# Patient Record
Sex: FEMALE
[De-identification: names, ages, dates, MRNs, and addresses within clinical notes are randomized per-mention and may not be internally consistent; named-entity substitution may affect disease eponyms.]

---

## 2018-06-11 LAB
BUN BLD-MCNC: 14 MG/DL (ref 6–20)
GLUCOSE SERPLBLD-MCNC: 95 MG/DL (ref 65–120)
HCT VFR BLD CALC: 37.9 % (ref 36–45)
INR BLD: 0.92
LYMPHOCYTES # SPEC AUTO: 2.2 K/UL (ref 0.7–4.9)
MCH RBC QN AUTO: 28.7 PG (ref 27–35)
MCV RBC: 87.1 FL (ref 80–100)
PMV BLD: 11 FL (ref 7.6–11.3)
POTASSIUM SERPL-SCNC: 4.2 MEQ/L (ref 3.6–5)
RBC # BLD: 4.36 M/UL (ref 3.86–4.86)
UA DIPSTICK W REFLEX MICRO PNL UR: (no result)

## 2018-06-14 ENCOUNTER — HOSPITAL ENCOUNTER (OUTPATIENT)
Dept: HOSPITAL 97 - OR | Age: 48
Discharge: HOME | End: 2018-06-14
Attending: OBSTETRICS & GYNECOLOGY
Payer: COMMERCIAL

## 2018-06-14 DIAGNOSIS — N92.1: Primary | ICD-10-CM

## 2018-06-14 DIAGNOSIS — N94.6: ICD-10-CM

## 2018-06-14 DIAGNOSIS — Z79.82: ICD-10-CM

## 2018-06-14 DIAGNOSIS — Z86.73: ICD-10-CM

## 2018-06-14 DIAGNOSIS — N83.8: ICD-10-CM

## 2018-06-14 DIAGNOSIS — K66.0: ICD-10-CM

## 2018-06-14 DIAGNOSIS — N94.10: ICD-10-CM

## 2018-06-14 DIAGNOSIS — N70.11: ICD-10-CM

## 2018-06-14 DIAGNOSIS — Z80.3: ICD-10-CM

## 2018-06-14 PROCEDURE — 0DNU4ZZ RELEASE OMENTUM, PERCUTANEOUS ENDOSCOPIC APPROACH: ICD-10-PCS

## 2018-06-14 PROCEDURE — 0UT24ZZ RESECTION OF BILATERAL OVARIES, PERCUTANEOUS ENDOSCOPIC APPROACH: ICD-10-PCS

## 2018-06-14 PROCEDURE — 36415 COLL VENOUS BLD VENIPUNCTURE: CPT

## 2018-06-14 PROCEDURE — 86850 RBC ANTIBODY SCREEN: CPT

## 2018-06-14 PROCEDURE — 0UT94ZZ RESECTION OF UTERUS, PERCUTANEOUS ENDOSCOPIC APPROACH: ICD-10-PCS

## 2018-06-14 PROCEDURE — 81025 URINE PREGNANCY TEST: CPT

## 2018-06-14 PROCEDURE — 85730 THROMBOPLASTIN TIME PARTIAL: CPT

## 2018-06-14 PROCEDURE — 88305 TISSUE EXAM BY PATHOLOGIST: CPT

## 2018-06-14 PROCEDURE — 0UT74ZZ RESECTION OF BILATERAL FALLOPIAN TUBES, PERCUTANEOUS ENDOSCOPIC APPROACH: ICD-10-PCS

## 2018-06-14 PROCEDURE — 88307 TISSUE EXAM BY PATHOLOGIST: CPT

## 2018-06-14 PROCEDURE — 81003 URINALYSIS AUTO W/O SCOPE: CPT

## 2018-06-14 PROCEDURE — 86900 BLOOD TYPING SEROLOGIC ABO: CPT

## 2018-06-14 PROCEDURE — 80048 BASIC METABOLIC PNL TOTAL CA: CPT

## 2018-06-14 PROCEDURE — 85610 PROTHROMBIN TIME: CPT

## 2018-06-14 PROCEDURE — 86901 BLOOD TYPING SEROLOGIC RH(D): CPT

## 2018-06-14 PROCEDURE — 85025 COMPLETE CBC W/AUTO DIFF WBC: CPT

## 2018-06-14 NOTE — XMS REPORT
Patient Summary Document

 Created on:2018



Patient:ITZEL STEEN

Sex:Female

:1970

External Reference #:[id number]





Demographics







 Address  [address] [place] [address]

 

 Home Phone  [phone]

 

 Email Address  [email or web address]

 

 Preferred Language  Unknown

 

 Marital Status  Unknown

 

 Catholic Affiliation  Unknown

 

 Race  Unknown

 

 Additional Race(s)  Unavailable

 

 Ethnic Group  Unknown









Author







 Organization  [facility]

 

 Address  [address] [place] [address] [address]. Ambrosio [address] [place] [address]

 

 Phone  [phone]









Care Team Providers







 Name  Role  Phone

 

 EDIS TREADWELL  Unavailable  Unavailable

 

 NICK ELLINGTON MIRYAM  Unavailable  Unavailable









Problems

This patient has no known problems.



Allergies, Adverse Reactions, Alerts

This patient has no known allergies or adverse reactions.



Medications

This patient has no known medications.



Results







 Test Description  Test Time  Test Comments  Text Results  Atomic Results  
Result Comments









 RAD, CHEST, 2  2018  Reason for exam:->Chest  FINAL REPORT PATIENT  



 VIEWS  11:24:00  painIs the patient  ID:   [id number]  PA and  



     pregnant?->NoShould this be  Lateral views of the  



     performed at the  chest dated 3/17/2018  



     bedside?->No  Clinical information:  



       Chest pain Comment:  



       Heart is normal in  



       size. Pulmonary  



       vasculature is  



       unremarkable. Lungs are  



       clear. No pulmonary  



       infiltrate or pleural  



       effusion is present.  



       Impression:  No active  



       cardiopulmonary  



       disease. Signed: Kvng Ayala Verified  



       Date/Time:  2018  



       11:24:20 Reading  



       Location: [address]  



       Ortho Consult Reading  



       Room  



       Electronically signed  



       by: KVNG AYALA M.D.  



       on 2018 11:24 AM  









 PT/APTT  2018 11:14:00    









   Test Item  Value  Reference Range  Comments









 PROTIME (BEAKER) (test code=759)  13.6 seconds  11.7-14.7  

 

 INR (BEAKER) (test code=370)  1.0  <=5.9  

 

 PARTIAL THROMBOPLASTIN TIME (BEAKER) (test code=760)  28.1 seconds  22.5-36.0  



RECOMMENDED COUMADIN/WARFARIN INR THERAPY RANGESSTANDARD DOSE: 2.0 - 3.0   
Includes: PROPHYLAXIS forvenous thrombosis, systemic embolization; TREATMENT 
for venous thrombosis and/or pulmonary embolus.HIGH RISK: Target INR is 2.5-3.5 
for patients with mechanical heart valves.CREATINE KINASE (CK), TOTAL AND [id number] 11:14:00





 Test Item  Value  Reference Range  Comments

 

 CREATINE KINASE TOTAL (BEAKER) (test code=380)  87 U/L    

 

 CREATINE KINASE-MB (BEAKER) (test code=750)  0.5 ng/mL  0.0-6.6  

 

 CREATINE KINASE-MB INDEX (BEAKER) (test code=395)  0.6 %    



CK-MB Reference Range:&lt;6.7      Normal6.7-10.0  Borderline&gt;10.0     
AbnormalTROPONIN -15-80 11:14:00





 Test Item  Value  Reference Range  Comments

 

 TROPONIN I (BEAKER) (test code=397)  < ng/mL  0.00-0.03  








 Test Item  Value  Reference Range  Comments

 

 MAGNESIUM (BEAKER) (test code=627)  2.1 mg/dL  1.6-2.6  



BASIC METABOLIC [id number] 11:12:00





 Test Item  Value  Reference Range  Comments

 

 SODIUM (BEAKER) (test  140 meq/L  136-145  



 wxrq=443)      

 

 POTASSIUM (BEAKER) (test  3.7 meq/L  3.5-5.1  



 code=379)      

 

 CHLORIDE (BEAKER) (test  105 meq/L    



 code=382)      

 

 CO2 (BEAKER) (test  25 meq/L  22-29  



 code=355)      

 

 BLOOD UREA NITROGEN  12 mg/dL  7-21  



 (BEAKER) (test code=354)      

 

 CREATININE (BEAKER) (test  0.66 mg/dL  0.57-1.25  



 code=358)      

 

 GLUCOSE RANDOM (BEAKER)  101 mg/dL    



 (test code=652)      

 

 CALCIUM (BEAKER) (test  9.4 mg/dL  8.4-10.2  



 code=697)      

 

 EGFR (BEAKER) (test  96 mL/min/1.73 sq m    ESTIMATED GFR IS NOT AS



 code=1092)      ACCURATE AS CREATININE



       CLEARANCE IN PREDICTING



       GLOMERULAR FILTRATION



       RATE. ESTIMATED GFR IS



       NOT APPLICABLE FOR



       DIALYSIS PATIENTS.



HEPATIC FUNCTION [id number] 11:12:00





 Test Item  Value  Reference Range  Comments

 

 TOTAL PROTEIN (BEAKER) (test code=770)  7.8 gm/dL  6.0-8.3  

 

 ALBUMIN (BEAKER) (test code=1145)  4.4 g/dL  3.5-5.0  

 

 BILIRUBIN TOTAL (BEAKER) (test code=377)  0.6 mg/dL  0.2-1.2  

 

 BILIRUBIN DIRECT (BEAKER) (test code=706)  0.3 mg/dL  0.1-0.5  

 

 ALKALINE PHOSPHATASE (BEAKER) (test code=346)  112 U/L    

 

 AST (SGOT) (BEAKER) (test code=353)  22 U/L  5-34  

 

 ALT (SGPT) (BEAKER) (test code=347)  24 U/L  6-55  



CBC W/PLT COUNT &amp; AUTO [id number] 10:51:00





 Test Item  Value  Reference Range  Comments

 

 WHITE BLOOD CELL COUNT (BEAKER) (test code=775)  8.8 K/ L  3.5-10.5  

 

 RED BLOOD CELL COUNT (BEAKER) (test code=761)  4.11 M/ L  3.93-5.22  

 

 HEMOGLOBIN (BEAKER) (test code=410)  12.2 GM/DL  11.2-15.7  

 

 HEMATOCRIT (BEAKER) (test code=411)  36.8 %  34.1-44.9  

 

 MEAN CORPUSCULAR VOLUME (BEAKER) (test code=753)  89.5 fL  79.4-94.8  

 

 MEAN CORPUSCULAR HEMOGLOBIN (BEAKER) (test  29.7 pg  25.6-32.2  



 fqld=174)      

 

 MEAN CORPUSCULAR HEMOGLOBIN CONC (BEAKER) (test  33.2 GM/DL  32.2-35.5  



 code=752)      

 

 RED CELL DISTRIBUTION WIDTH (BEAKER) (test  13.1 %  11.7-14.4  



 code=412)      

 

 PLATELET COUNT (BEAKER) (test code=756)  247 K/CU MM  150-450  

 

 MEAN PLATELET VOLUME (BEAKER) (test code=754)  11.8 fL  9.4-12.3  

 

 NUCLEATED RED BLOOD CELLS (BEAKER) (test  0 /100 WBC  0-0  



 code=413)      

 

 NEUTROPHILS RELATIVE PERCENT (BEAKER) (test  74 %    



 code=429)      

 

 LYMPHOCYTES RELATIVE PERCENT (BEAKER) (test  20 %    



 code=430)      

 

 MONOCYTES RELATIVE PERCENT (BEAKER) (test  5 %    



 code=431)      

 

 EOSINOPHILS RELATIVE PERCENT (BEAKER) (test  0 %    



 code=432)      

 

 BASOPHILS RELATIVE PERCENT (BEAKER) (test  1 %    



 code=437)      

 

 NEUTROPHILS ABSOLUTE COUNT (BEAKER) (test  6.49 K/ L  1.56-6.13  



 code=670)      

 

 LYMPHOCYTES ABSOLUTE COUNT (BEAKER) (test  1.77 K/ L  1.18-3.74  



 code=414)      

 

 MONOCYTES ABSOLUTE COUNT (BEAKER) (test  0.43 K/ L  0.24-0.36  



 code=415)      

 

 EOSINOPHILS ABSOLUTE COUNT (BEAKER) (test  0.01 K/ L  0.04-0.36  



 code=416)      

 

 BASOPHILS ABSOLUTE COUNT (BEAKER) (test  0.04 K/ L  0.01-0.08  



 code=417)      

 

 IMMATURE GRANULOCYTES-RELATIVE PERCENT (BEAKER)  0 %  0-1  



 (test code=2801)      



BASIC METABOLIC [id number] 04:33:00





 Test Item  Value  Reference Range  Comments

 

 SODIUM (BEAKER) (test  140 meq/L  136-145  



 xjal=418)      

 

 POTASSIUM (BEAKER) (test  3.7 meq/L  3.5-5.1  Specimen slightly



 code=379)      hemolyzed

 

 CHLORIDE (BEAKER) (test  105 meq/L    



 code=382)      

 

 CO2 (BEAKER) (test  23 meq/L  22-29  



 code=355)      

 

 BLOOD UREA NITROGEN  16 mg/dL  7-21  



 (BEAKER) (test code=354)      

 

 CREATININE (BEAKER) (test  0.75 mg/dL  0.57-1.25  Specimen slightly



 code=358)      hemolyzed

 

 GLUCOSE RANDOM (BEAKER)  96 mg/dL    



 (test code=652)      

 

 CALCIUM (BEAKER) (test  9.2 mg/dL  8.4-10.2  



 code=697)      

 

 EGFR (BEAKER) (test  83 mL/min/1.73 sq m    ESTIMATED GFR IS NOT AS



 code=1092)      ACCURATE AS CREATININE



       CLEARANCE IN PREDICTING



       GLOMERULAR FILTRATION



       RATE. ESTIMATED GFR IS



       NOT APPLICABLE FOR



       DIALYSIS PATIENTS.



CBC W/PLT COUNT &amp; AUTO [id number] 04:11:00





 Test Item  Value  Reference Range  Comments

 

 WHITE BLOOD CELL COUNT (BEAKER) (test code=775)  8.6 K/ L  3.5-10.5  

 

 RED BLOOD CELL COUNT (BEAKER) (test code=761)  4.39 M/ L  3.93-5.22  

 

 HEMOGLOBIN (BEAKER) (test code=410)  12.8 GM/DL  11.2-15.7  

 

 HEMATOCRIT (BEAKER) (test code=411)  39.6 %  34.1-44.9  

 

 MEAN CORPUSCULAR VOLUME (BEAKER) (test code=753)  90.2 fL  79.4-94.8  

 

 MEAN CORPUSCULAR HEMOGLOBIN (BEAKER) (test  29.2 pg  25.6-32.2  



 gmvj=904)      

 

 MEAN CORPUSCULAR HEMOGLOBIN CONC (BEAKER) (test  32.3 GM/DL  32.2-35.5  



 code=752)      

 

 RED CELL DISTRIBUTION WIDTH (BEAKER) (test  13.7 %  11.7-14.4  



 code=412)      

 

 PLATELET COUNT (BEAKER) (test code=756)  253 K/CU MM  150-450  

 

 MEAN PLATELET VOLUME (BEAKER) (test code=754)  12.4 fL  9.4-12.3  

 

 NUCLEATED RED BLOOD CELLS (BEAKER) (test  0 /100 WBC  0-0  



 code=413)      

 

 NEUTROPHILS RELATIVE PERCENT (BEAKER) (test  50 %    



 code=429)      

 

 LYMPHOCYTES RELATIVE PERCENT (BEAKER) (test  40 %    



 code=430)      

 

 MONOCYTES RELATIVE PERCENT (BEAKER) (test  8 %    



 code=431)      

 

 EOSINOPHILS RELATIVE PERCENT (BEAKER) (test  2 %    



 code=432)      

 

 BASOPHILS RELATIVE PERCENT (BEAKER) (test  1 %    



 code=437)      

 

 NEUTROPHILS ABSOLUTE COUNT (BEAKER) (test  4.28 K/ L  1.56-6.13  



 code=670)      

 

 LYMPHOCYTES ABSOLUTE COUNT (BEAKER) (test  3.47 K/ L  1.18-3.74  



 code=414)      

 

 MONOCYTES ABSOLUTE COUNT (BEAKER) (test  0.68 K/ L  0.24-0.36  



 code=415)      

 

 EOSINOPHILS ABSOLUTE COUNT (BEAKER) (test  0.14 K/ L  0.04-0.36  



 code=416)      

 

 BASOPHILS ABSOLUTE COUNT (BEAKER) (test  0.05 K/ L  0.01-0.08  



 code=417)      

 

 IMMATURE GRANULOCYTES-RELATIVE PERCENT (BEAKER)  0 %  0-1  



 (test code=2801)      



MR, MRA, NECK, WITHOUT IV CONTRAST2018-03-10 17:43:00FINAL REPORT PATIENT ID:   
[id number] MRA Head CLINICAL HISTORY: Stroke TECHNIQUE: MRA of the head utilizing 
3-D time-of-flight technique, with 3-D reconstructions. COMPARISON: None 
FINDINGS: There is noevidence of intracranial aneurysm, focal stenosis, or 
major branch vessel occlusion.   There is a fetal origin right posterior 
cerebral artery. IMPRESSION: No evidence for a major [place] proximal 
branch vessel occlusion.   MRA Neck CLINICAL HISTORY: Stroke TECHNIQUE: MRA of 
the neck utilizing 2-D and 3-D time-of-flight technique, with 3-D 
reconstructions. COMPARISON: None FINDINGS: The carotid arteries in the neck 
are patent including their bifurcations.    There is antegrade flow in the 
vertebral arteries in the neck. IMPRESSION: No evidence of hemodynamically 
significant stenosis in thecervical carotid or vertebral arteries by NASCET 
criteria. Signed: Doreen Schmidt MDReport Verified Date/Time:  03/10/2018 17:43:
53 Reading Location: [address] Neuro Reading Room      Electronically signed 
by: DOREEN SCHMIDT M.D. on 03/10/2018 05:43 PMMR, MRA, BRAIN, WITHOUT 
CONTRAST2018-03-10 17:43:00Reason for exam:-&gt;StrokeIs the patient pregnant?-&
gt;NoWhat is the patient's sedation requirement?-&gt;No SedationIs the patient 
claustrophobic?-&gt;NoFINAL REPORT PATIENT ID:   [id number] MRA Head CLINICAL 
HISTORY: Stroke TECHNIQUE: MRA of the head utilizing 3-D time-of-flight 
technique, with 3-D reconstructions. COMPARISON: None FINDINGS: There is 
noevidence of intracranial aneurysm, focal stenosis, or major branch vessel 
occlusion.   There is a fetal origin right posterior cerebral artery. IMPRESSION
: No evidence for a major [place] proximal branch vessel occlusion.   
MRA Neck CLINICAL HISTORY: Stroke TECHNIQUE: MRA of the neck utilizing 2-D and 3
-D time-of-flight technique, with 3-D reconstructions. COMPARISON: None FINDINGS
: The carotid arteries in the neck are patent including their bifurcations.    
There is antegrade flow in the vertebral arteries in the neck. IMPRESSION: No 
evidence of hemodynamically significant stenosis in thecervical carotid or 
vertebral arteries by NASCET criteria. Signed: Doreen Schmidt Verified 
Date/Time:  03/10/2018 17:43:53 Reading Location: [address] Neuro Reading 
Room      Electronically signed by: DOREEN SCHMIDT M.D. on 03/10/2018 05:43 
PMMR, BRAIN, WITHOUT CONTRAST2018-03-10 17:37:00Reason for exam:-&gt;StrokeIs 
the patient pregnant?-&gt;NoWhat is the patient's sedation requirement?-&gt;No 
SedationIs the patient claustrophobic?-&gt;NoFINAL REPORT PATIENT ID:   
[id number] MRI Brain without contrast Clinical History: Stroke Technique: MRI of 
the brain utilizing axial T2, FLAIR, GRE, DWI; sagittal and coronal T1-weighted 
images. Comparisons: None Findings: There is no evidence of acute infarct or 
hemorrhage. There is no significant appearing white matter disease. There is 
mild generalized sulcal prominence without hydrocephalus, midline shift, or 
apparent mass effect. There are no extra-axial fluid collections. The 
craniocervical junction is preserved. The major intracranial flow-voids appear 
patent.  IMPRESSION: No evidence of acute infarct, hemorrhage, or 
hydrocephalus. Signed: Doreen Schmidt Verified Date/Time:  03/10/2018 17
:37:18 Reading Location: [address] Neuro Reading Room      Electronically 
signed by: DOREEN GEORGE M.D. on 03/10/2018 05:37 PMHEMOGLOBIN A1C2018-03-10 11
:57:00





 Test Item  Value  Reference Range  Comments

 

 HEMOGLOBIN A1C (BEAKER) (test code=368)  5.3 %  4.3-6.1  



VITAMIN B12 AND FOLATE2018-03-10 05:35:00





 Test Item  Value  Reference Range  Comments

 

 VITAMIN B12 (BEAKER) (test code=774)  > pg/mL  213-816  

 

 FOLATE (BEAKER) (test code=362)  15.0 ng/mL  >=7.0  



LIPID PANEL2018-03-10 04:56:00





 Test Item  Value  Reference Range  Comments

 

 TRIGLYCERIDES (BEAKER) (test code=540)  58 mg/dL    

 

 CHOLESTEROL (BEAKER) (test code=631)  170 mg/dL    

 

 HDL CHOLESTEROL (BEAKER) (test code=976)  51 mg/dL    

 

 LDL CHOLESTEROL CALCULATED (BEAKER) (test  107 mg/dL    



 code=633)      



Triglyceride Reference Range:   Low Risk         &lt;150   Borderline    150-
199   High Risk     200-499   Very High Risk  &gt;=500Cholesterol Reference 
Range:   Low Risk         &lt;200   Borderline 200-239    High Risk        &gt;
240HDL Cholesterol Reference Range:   Low Risk         &gt;=60   High Risk     
    &lt;40LDL Cholesterol Reference Range:   Optimal          &lt;100   Near 
Optimal  100-129   Borderline    130-159   High          160-189   Very High   
    &gt;=190   FastingBASIC METABOLIC PANEL2018-03-10 04:56:00





 Test Item  Value  Reference Range  Comments

 

 SODIUM (BEAKER) (test  138 meq/L  136-145  



 ddfd=158)      

 

 POTASSIUM (BEAKER) (test  3.9 meq/L  3.5-5.1  



 code=379)      

 

 CHLORIDE (BEAKER) (test  108 meq/L    



 code=382)      

 

 CO2 (BEAKER) (test  21 meq/L  22-29  



 code=355)      

 

 BLOOD UREA NITROGEN  14 mg/dL  7-21  



 (BEAKER) (test code=354)      

 

 CREATININE (BEAKER) (test  0.65 mg/dL  0.57-1.25  



 code=358)      

 

 GLUCOSE RANDOM (BEAKER)  100 mg/dL    



 (test code=652)      

 

 CALCIUM (BEAKER) (test  9.1 mg/dL  8.4-10.2  



 code=697)      

 

 EGFR (BEAKER) (test  98 mL/min/1.73 sq m    ESTIMATED GFR IS NOT AS



 code=1092)      ACCURATE AS CREATININE



       CLEARANCE IN PREDICTING



       GLOMERULAR FILTRATION



       RATE. ESTIMATED GFR IS



       NOT APPLICABLE FOR



       DIALYSIS PATIENTS.



FastingTROPONIN -03-10 04:54:00





 Test Item  Value  Reference Range  Comments

 

 TROPONIN I (BEAKER) (test code=397)  0.01 ng/mL  0.00-0.03  



Troponin I (TnI) levels must be interpreted in the context of the presenting 
symptoms and the clinical findings. Elevated TnI levels indicate myocardial 
damage, but are not specific for ischemic heart disease. Elevated TnI levels 
are seen in patients with other cardiac conditions (including myocarditis and 
congestive heart failure), and slight TnI elevations occur in patients with 
other conditions, including sepsis, renal failure, acidosis, acute neurological 
disease, and persistent tachyarrhythmia.FastingCBC W/PLT COUNT &amp; AUTO 
DIFFERENTIAL2018-03-10 04:37:00





 Test Item  Value  Reference Range  Comments

 

 WHITE BLOOD CELL COUNT (BEAKER) (test code=775)  8.3 K/ L  3.5-10.5  

 

 RED BLOOD CELL COUNT (BEAKER) (test code=761)  4.01 M/ L  3.93-5.22  

 

 HEMOGLOBIN (BEAKER) (test code=410)  12.0 GM/DL  11.2-15.7  

 

 HEMATOCRIT (BEAKER) (test code=411)  36.0 %  34.1-44.9  

 

 MEAN CORPUSCULAR VOLUME (BEAKER) (test code=753)  89.8 fL  79.4-94.8  

 

 MEAN CORPUSCULAR HEMOGLOBIN (BEAKER) (test  29.9 pg  25.6-32.2  



 oqhq=203)      

 

 MEAN CORPUSCULAR HEMOGLOBIN CONC (BEAKER) (test  33.3 GM/DL  32.2-35.5  



 code=752)      

 

 RED CELL DISTRIBUTION WIDTH (BEAKER) (test  13.6 %  11.7-14.4  



 code=412)      

 

 PLATELET COUNT (BEAKER) (test code=756)  239 K/CU MM  150-450  

 

 MEAN PLATELET VOLUME (BEAKER) (test code=754)  11.8 fL  9.4-12.3  

 

 NUCLEATED RED BLOOD CELLS (BEAKER) (test  0 /100 WBC  0-0  



 code=413)      

 

 NEUTROPHILS RELATIVE PERCENT (BEAKER) (test  60 %    



 code=429)      

 

 LYMPHOCYTES RELATIVE PERCENT (BEAKER) (test  31 %    



 code=430)      

 

 MONOCYTES RELATIVE PERCENT (BEAKER) (test  9 %    



 code=431)      

 

 EOSINOPHILS RELATIVE PERCENT (BEAKER) (test  1 %    



 code=432)      

 

 BASOPHILS RELATIVE PERCENT (BEAKER) (test  0 %    



 code=437)      

 

 NEUTROPHILS ABSOLUTE COUNT (BEAKER) (test  4.92 K/ L  1.56-6.13  



 code=670)      

 

 LYMPHOCYTES ABSOLUTE COUNT (BEAKER) (test  2.53 K/ L  1.18-3.74  



 code=414)      

 

 MONOCYTES ABSOLUTE COUNT (BEAKER) (test  0.70 K/ L  0.24-0.36  



 code=415)      

 

 EOSINOPHILS ABSOLUTE COUNT (BEAKER) (test  0.07 K/ L  0.04-0.36  



 code=416)      

 

 BASOPHILS ABSOLUTE COUNT (BEAKER) (test  0.03 K/ L  0.01-0.08  



 code=417)      

 

 IMMATURE GRANULOCYTES-RELATIVE PERCENT (BEAKER)  0 %  0-1  



 (test code=2801)      



TROPONIN -03-10 01:17:00





 Test Item  Value  Reference Range  Comments

 

 TROPONIN I (BEAKER) (test code=397)  < ng/mL  0.00-0.03  



Troponin I (TnI) levels must be interpreted in the context of the presenting 
symptoms and the clinical findings. Elevated TnI levels indicate myocardial 
damage, but are not specific for ischemic heart disease. Elevated TnI levels 
are seen in patients with other cardiac conditions (including myocarditis and 
congestive heart failure), and slight TnI elevations occur in patients with 
other conditions, including sepsis, renal failure, acidosis, acute neurological 
disease, and persistent tachyarrhythmia.TSH/FREE T4 IF [id number] 19:24
:00





 Test Item  Value  Reference Range  Comments

 

 THYROID STIMULATING HORMONE (BEAKER) (test  1.15 uIU/mL  0.35-4.94  



 code=772)      



HEPATIC FUNCTION [id number] 19:04:00





 Test Item  Value  Reference Range  Comments

 

 TOTAL PROTEIN (BEAKER) (test code=770)  7.5 gm/dL  6.0-8.3  

 

 ALBUMIN (BEAKER) (test code=1145)  4.1 g/dL  3.5-5.0  

 

 BILIRUBIN TOTAL (BEAKER) (test code=377)  0.5 mg/dL  0.2-1.2  

 

 BILIRUBIN DIRECT (BEAKER) (test code=706)  0.2 mg/dL  0.1-0.5  

 

 ALKALINE PHOSPHATASE (BEAKER) (test code=346)  98 U/L    

 

 AST (SGOT) (BEAKER) (test code=353)  26 U/L  5-34  

 

 ALT (SGPT) (BEAKER) (test code=347)  17 U/L  6-55  



BASIC METABOLIC [id number] 19:04:00





 Test Item  Value  Reference Range  Comments

 

 SODIUM (BEAKER) (test  140 meq/L  136-145  



 spen=919)      

 

 POTASSIUM (BEAKER) (test  3.6 meq/L  3.5-5.1  



 code=379)      

 

 CHLORIDE (BEAKER) (test  108 meq/L    



 code=382)      

 

 CO2 (BEAKER) (test  24 meq/L  22-29  



 code=355)      

 

 BLOOD UREA NITROGEN  11 mg/dL  7-21  



 (BEAKER) (test code=354)      

 

 CREATININE (BEAKER) (test  0.59 mg/dL  0.57-1.25  



 code=358)      

 

 GLUCOSE RANDOM (BEAKER)  96 mg/dL    



 (test code=652)      

 

 CALCIUM (BEAKER) (test  10.4 mg/dL  8.4-10.2  



 code=697)      

 

 EGFR (BEAKER) (test  110 mL/min/1.73 sq m    ESTIMATED GFR IS NOT AS



 code=1092)      ACCURATE AS CREATININE



       CLEARANCE IN PREDICTING



       GLOMERULAR FILTRATION



       RATE. ESTIMATED GFR IS



       NOT APPLICABLE FOR



       DIALYSIS PATIENTS.



PROTHROMBIN TIME/[id number] 18:22:00





 Test Item  Value  Reference Range  Comments

 

 PROTIME (BEAKER) (test code=759)  13.5 seconds  11.7-14.7  

 

 INR (BEAKER) (test code=370)  1.0  <=5.9  



RECOMMENDED COUMADIN/WARFARIN INR THERAPY RANGESSTANDARD DOSE: 2.0 - 3.0   
Includes: PROPHYLAXIS forvenous thrombosis, systemic embolization; TREATMENT 
for venous thrombosis and/or pulmonary embolus.HIGH RISK: Target INR is 2.5-3.5 
for patients with mechanical heart valves.URINALYSIS W/ [id number] 18
:11:00





 Test Item  Value  Reference Range  Comments

 

 COLOR (BEAKER) (test code=470)  Light Yellow    

 

 CLARITY (BEAKER) (test code=469)  Clear    

 

 SPECIFIC GRAVITY UA (BEAKER) (test code=468)  1.006  1.001-1.035  

 

 PH UA (BEAKER) (test code=467)  7.0  5.0-8.0  

 

 PROTEIN UA (BEAKER) (test code=464)  Negative  Negative  

 

 GLUCOSE UA (BEAKER) (test code=365)  Negative  Negative  

 

 KETONES UA (BEAKER) (test code=371)  40 mg/dL  Negative  

 

 BILIRUBIN UA (BEAKER) (test code=462)  Negative  Negative  

 

 BLOOD UA (BEAKER) (test code=461)  Negative  Negative  

 

 NITRITE UA (BEAKER) (test code=465)  Negative  Negative  

 

 LEUKOCYTE ESTERASE UA (BEAKER) (test code=466)  Negative  Negative  

 

 UROBILINOGEN UA (BEAKER) (test code=463)  0.2 mg/dL  0.2-1.0  

 

 RBC UA (BEAKER) (test code=519)  0 /HPF    

 

 WBC UA (BEAKER) (test code=520)  0 /HPF    

 

 MUCUS (BEAKER) (test code=1574)  Rare    

 

 SQUAMOUS EPITHELIAL (BEAKER) (test code=516)  6 /HPF    

 

 SOURCE(BEAKER) (test code=2795)  Urine, Voided    



CBC W/PLT COUNT &amp; AUTO [id number] 17:49:00





 Test Item  Value  Reference Range  Comments

 

 WHITE BLOOD CELL COUNT (BEAKER) (test code=775)  9.5 K/ L  3.5-10.5  

 

 RED BLOOD CELL COUNT (BEAKER) (test code=761)  4.29 M/ L  3.93-5.22  

 

 HEMOGLOBIN (BEAKER) (test code=410)  13.1 GM/DL  11.2-15.7  

 

 HEMATOCRIT (BEAKER) (test code=411)  38.3 %  34.1-44.9  

 

 MEAN CORPUSCULAR VOLUME (BEAKER) (test code=753)  89.3 fL  79.4-94.8  

 

 MEAN CORPUSCULAR HEMOGLOBIN (BEAKER) (test  30.5 pg  25.6-32.2  



 prmy=287)      

 

 MEAN CORPUSCULAR HEMOGLOBIN CONC (BEAKER) (test  34.2 GM/DL  32.2-35.5  



 code=752)      

 

 RED CELL DISTRIBUTION WIDTH (BEAKER) (test  13.6 %  11.7-14.4  



 code=412)      

 

 PLATELET COUNT (BEAKER) (test code=756)  125 K/CU MM  150-450  

 

 MEAN PLATELET VOLUME (BEAKER) (test code=754)  11.9 fL  9.4-12.3  

 

 NUCLEATED RED BLOOD CELLS (BEAKER) (test  0 /100 WBC  0-0  



 code=413)      

 

 NEUTROPHILS RELATIVE PERCENT (BEAKER) (test  76 %    



 code=429)      

 

 LYMPHOCYTES RELATIVE PERCENT (BEAKER) (test  18 %    



 code=430)      

 

 MONOCYTES RELATIVE PERCENT (BEAKER) (test  5 %    



 code=431)      

 

 EOSINOPHILS RELATIVE PERCENT (BEAKER) (test  0 %    



 code=432)      

 

 BASOPHILS RELATIVE PERCENT (BEAKER) (test  1 %    



 code=437)      

 

 NEUTROPHILS ABSOLUTE COUNT (BEAKER) (test  7.21 K/ L  1.56-6.13  



 code=670)      

 

 LYMPHOCYTES ABSOLUTE COUNT (BEAKER) (test  1.69 K/ L  1.18-3.74  



 code=414)      

 

 MONOCYTES ABSOLUTE COUNT (BEAKER) (test  0.45 K/ L  0.24-0.36  



 code=415)      

 

 EOSINOPHILS ABSOLUTE COUNT (BEAKER) (test  0.01 K/ L  0.04-0.36  



 code=416)      

 

 BASOPHILS ABSOLUTE COUNT (BEAKER) (test  0.05 K/ L  0.01-0.08  



 code=417)      

 

 IMMATURE GRANULOCYTES-RELATIVE PERCENT (BEAKER)  0 %  0-1  



 (test code=2801)

## 2018-06-14 NOTE — XMS REPORT
Clinical Summary

 Created on:2018



Patient:Radha Dejesus

Sex:Female

:1970

External Reference #:[id number]





Demographics







 Address  [address] 



   [place] [address]

 

 Home Phone  [phone]

 

 Email Address  [email or web address]

 

 Preferred Language  English

 

 Marital Status  Unknown

 

 Buddhism Affiliation  Unknown

 

 Race  White

 

 Ethnic Group   or 









Author







 Organization  [facility]

 

 Address  [address] [place] [address]

 

 Phone  [phone]









Support







 Name  Relationship  Address  Phone

 

 Unavailable  Unavailable  [address]  [phone]



     [place] [address]  

 

 Unavailable  Unavailable  Unavailable  [phone]









Care Team Providers







 Name  Role  Phone

 

 Unavailable  Primary Care Provider  Unavailable









Allergies







 Active Allergy  Reactions  Severity  Noted Date  Comments

 

 Cefdinir  Hives    2018  

 

 Ciprofloxacin  Hives    2018  

 

 Sulfamethoxazole-Trimethoprim      2018  







Current Medications







 Prescription  Sig.  Disp.  Refills  Start Date  End Date  Status

 

 aspirin 81 MG  Take 1 tablet  90 tablet  2  2018  Active



 chewable tablet  (81 mg total)          



   by mouth daily.          

 

 atorvastatin  Take 1 tablet  60 tablet  2  2018  Active



 (LIPITOR) 40 MG  (40 mg total)          



 tablet  by mouth          



   nightly.          

 

 gabapentin  Take 1 capsule  90 capsule  2  2018  Active



 (NEURONTIN) 100 MG  (100 mg total)          



 capsule  by mouth 3          



   (three) times          



   daily.          

 

 acetaminophen  Take 1 tablet  30 tablet  0  2018  



 (TYLENOL) 500 MG  (500 mg total)          



 tablet  by mouth every          



   6 (six) hours          



   as needed for          



   Pain for up to          



   10 days.          







Active Problems







 Problem  Noted Date

 

 Ischemic stroke (HCC)  2018

 

 S/P admn tPA in diff fac w/n last 24 hr bef adm to crnt fac  2018







Encounters







 Date  Type  Specialty  Care Team  Description

 

 2018  Emergency  Emergency Medicine  Edis Treadwell  Chest pain,



       MD Ar  unspecified type



         (Primary



         Dx);Palpitations

 

 2018  Orders Only  General Internal    



     Medicine    

 

 2018 -  Hospital Encounter  Intensive Care  Osmar Linton  Ischemic 
stroke



 2018      Brie  ([facility]);S/P admn tPA



       MD  in diff fac w/n last



         24 hr bef adm to



         crnt fac



after 2017



Social History







 Tobacco Use  Types  Packs/Day  Years Used  Date

 

 Never Smoker        









 Smokeless Tobacco: Never Used      









 Alcohol Use  Drinks/Week  oz/Week  Comments

 

 Yes      1 drink a month









 Sex Assigned at Birth  Date Recorded

 

 Not on file  







Last Filed Vital Signs







 Vital Sign  Reading  Time Taken

 

 Blood Pressure  123/69  2018 12:11 PM CDT

 

 Pulse  72  2018 12:11 PM CDT

 

 Temperature  36.6 C (97.9 F)  2018  8:20 AM CDT

 

 Respiratory Rate  16  2018 12:11 PM CDT

 

 Oxygen Saturation  100%  2018 12:11 PM CDT

 

 Inhaled Oxygen Concentration  -  -

 

 Weight  62.1 kg (137 lb)  2018  8:16 AM CDT

 

 Height  157.5 cm (5' 2")  2018  8:16 AM CDT

 

 Body Mass Index  25.06  2018  8:16 AM CDT







Plan of Treatment

Not on file



Results

ED ECG Interpretation (2018 12:17 PM)





 Narrative

 

 Edis Treadwell MD 3/17/2018 12:17 PM







 ECG/EKG Interpretation







 Date/Time: 3/17/2018 10:15 AM







 Performed by: EDIS TREADWELL







 Authorized by: EDIS TREADWELL 







 The ECG was interpreted by ED physician. The ECG is interpreted as sinus 







 rhythm. Rate is normal rate. Heart rate is 81 BPM.







 Conduction: conduction normal. ST segments normal. ECG reviewed and does 







 not meet STEMI criteria. Patient tolerance: Patient tolerated the 







 procedure well with no immediate complications







 



XR chest 2 views (2018 11:21 AM)





 Specimen  Performing Laboratory

 

   GE R[facility]









 Narrative

 

 FINAL REPORT







  







 PATIENT ID: [id number]







  







  







 PA and Lateral views of the chest dated 3/17/2018







  







 Clinical information: Chest pain







  







 Comment:Heart is normal in size. Pulmonary vasculature is 







 unremarkable. Lungs are clear. No pulmonary infiltrate or pleural 







 effusion is present. 







  







 Impression:No active cardiopulmonary disease.







  







 Signed: Kvng Ayala MD







 Report Verified Date/Time:2018 11:24:20







  







 Reading Location: [facility] Ortho Consult Reading Room







 Electronically signed by: KVNG AYALA M.D. on 2018 11:24 AM







 









 Procedure Note

 

 Interface, External Ris In - 2018 11:26 AM CDT



FINAL REPORT



 



 PATIENT ID:   [id number]



 



 



 PA and Lateral views of the chest dated 3/17/2018



 



 Clinical information: Chest pain



 



 Comment:  Heart is normal in size. Pulmonary vasculature is



 unremarkable. Lungs are clear. No pulmonary infiltrate or pleural



 effusion is present.



 



 Impression:  No active cardiopulmonary disease.



 



 Signed: Kvng Ayala MD



 Report Verified Date/Time:  2018 11:24:20



 



 Reading Location: [facility] Ortho Consult Reading Room



       Electronically signed by: KVNG AYALA M.D. on 2018 11:24 AM



 



PT/aPTT (2018 10:21 AM)





 Component  Value  Ref Range

 

 Protime  13.6  11.7 - 14.7 seconds

 

 INR  1.0  <=5.9

 

 PTT  28.1  22.5 - 36.0 seconds









 Specimen  Performing Laboratory

 

 [place] [address]









 Narrative

 

  







 RECOMMENDED COUMADIN/WARFARIN INR THERAPY RANGES







 STANDARD DOSE: 2.0 - 3.0 Includes: PROPHYLAXIS for venous thrombosis, 
systemic



 embolization; TREATMENT for venous thrombosis and/or pulmonary embolus.







 HIGH RISK: Target INR is 2.5-3.5 for patients with mechanical heart valves.



CBC with platelet count + automated diff (2018 10:21 AM)Only the most 
recent of4 resultswithin the time period is included.





 Component  Value  Ref Range

 

 WBC  8.8  3.5 - 10.5 K/L

 

 RBC  4.11  3.93 - 5.22 M/L

 

 Hemoglobin  12.2  11.2 - 15.7 GM/DL

 

 Hematocrit  36.8  34.1 - 44.9 %

 

 MCV  89.5  79.4 - 94.8 fL

 

 MCH  29.7  25.6 - 32.2 pg

 

 MCHC  33.2  32.2 - 35.5 GM/DL

 

 RDW  13.1  11.7 - 14.4 %

 

 Platelets  247  150 - 450 K/CU MM

 

 MPV  11.8  9.4 - 12.3 fL

 

 nRBC  0  0 - 0 /100 WBC

 

 % Neutros  74  %

 

 % Lymphs  20  %

 

 % Monos  5  %

 

 % Eos  0  %

 

 % Baso  1  %

 

 # Neutros  6.49 (H)  1.56 - 6.13 K/L

 

 # Lymphs  1.77  1.18 - 3.74 K/L

 

 # Monos  0.43 (H)  0.24 - 0.36 K/L

 

 # Eos  0.01 (L)  0.04 - 0.36 K/L

 

 # Baso  0.04  0.01 - 0.08 K/L

 

 Immature Granulocytes-Relative  0  0 - 1 %









 Specimen  Performing Laboratory

 

 Blood  [address] [address]



Troponin I (2018 10:21 AM)Only the most recent of3 resultswithin the time 
period is included.





 Component  Value  Ref Range

 

 Troponin I  <0.01  0.00 - 0.03 ng/mL









 Specimen  Performing Laboratory

 

 [address] [address]









 Narrative

 

  







 Troponin I (TnI) levels must be interpreted in the context of the presenting 
symptoms



 and the clinical findings. Elevated TnI levels indicate myocardial damage, but 
are



 not specific for ischemic heart disease. Elevated TnI levels are seen in 
patients



 with other cardiac conditions (including myocarditis and congestive heart 
failure),



 and slight TnI elevations occur in patients with other conditions, including 
sepsis,



 renal failure, acidosis, acute neurological disease, and persistent 
tachyarrhythmia.



CBC with platelet count + automated diff (2018 10:21 AM)Only the most 
recent of4 resultswithin the time period is included.





 Specimen  Performing Laboratory

 

 Blood  









 Narrative

 

 The following orders were created for panel order CBC with platelet count + 
automated



 diff.







 Procedure



 Abnormality Status 







 ---------



 ----------- ------ 







 CBC with platelet count ...[[id number]]AbnormalFinal



 result 







  







 Please view results for these tests on the individual orders.



Magnesium (2018 10:21 AM)





 Component  Value  Ref Range

 

 Magnesium  2.1  1.6 - 2.6 mg/dL









 Specimen  Performing Laboratory

 

 Blood  [address] [address]



Creatine Kinase (CK), Total and MB (2018 10:21 AM)





 Component  Value  Ref Range

 

 Total CK  87  29 - 200 U/L

 

 CK-MB  0.5  0.0 - 6.6 ng/mL

 

 MB Relative Index  0.6  %









 Specimen  Performing Laboratory

 

 Blood  [facility] [address] [address]









 Narrative

 

 CK-MB Reference Range:







 <6.7Normal







 6.7-10.0Borderline







 >10.0 Abnormal



Hepatic function panel (2018 10:21 AM)Only the most recent of2 
resultswithin the time period is included.





 Component  Value  Ref Range

 

 Protein, Total  7.8  6.0 - 8.3 gm/dL

 

 Albumin  4.4  3.5 - 5.0 g/dL

 

 Total Bilirubin  0.6  0.2 - 1.2 mg/dL

 

 Bilirubin, Direct  0.3  0.1 - 0.5 mg/dL

 

 Alkaline Phosphatase  112  40 - 150 U/L

 

 AST  22  5 - 34 U/L

 

 ALT  24  6 - 55 U/L









 Specimen  Performing Laboratory

 

 Blood  [address] [address]



Basic Metabolic Panel (2018 10:21 AM)Only the most recent of4 
resultswithin the time period is included.





 Component  Value  Ref Range

 

 Sodium  140  136 - 145 meq/L

 

 Potassium  3.7  3.5 - 5.1 meq/L

 

 Chloride  105  98 - 107 meq/L

 

 CO2  25  22 - 29 meq/L

 

 BUN  12  7 - 21 mg/dL

 

 Creatinine  0.66  0.57 - 1.25 mg/dL

 

 Glucose  101  70 - 105 mg/dL

 

 Calcium  9.4  8.4 - 10.2 mg/dL

 

 EGFR  96Comment: ESTIMATED GFR IS NOT AS ACCURATE AS  mL/min/1.73 sq m



   CREATININE CLEARANCE IN PREDICTING GLOMERULAR FILTRATION  



   RATE. ESTIMATED GFR IS NOT APPLICABLE FOR DIALYSIS  



   PATIENTS.  









 Specimen  Performing Laboratory

 

 Blood  [address] [address]



ECG 12 lead (2018  8:14 AM)





 Specimen  Performing Laboratory

 

   Timeful MUSE









 Narrative

 

 Ventricular Rate 81 BPM







 Atrial Rate 81 BPM







 P-R Interval 152 ms







 QRS Duration 76 ms







 Q-T Interval 378 ms







 QTC Calculation(Bazett) 439 ms







 P Axis 61 degrees







 R Axis 6 degrees







 T Axis 12 degrees







  







 Normal sinus rhythm







 Low voltage QRS







 Poor R wave progression







 Borderline ECG







 No previous ECGs available







 Confirmed by Chato Jackson ([id number]) on 3/18/2018 7:55:10 AM









 Procedure Note

 

 Interface, External Ris In - 2018  7:55 AM CDT



Ventricular Rate 81 BPM



 Atrial Rate 81 BPM



 P-R Interval 152 ms



 QRS Duration 76 ms



 Q-T Interval 378 ms



 QTC Calculation(Bazett) 439 ms



 P Axis 61 degrees



 R Axis 6 degrees



 T Axis 12 degrees



 



 Normal sinus rhythm



 Low voltage QRS



 Poor R wave progression



 Borderline ECG



 No previous ECGs available



 Confirmed by Chato Jackson ([id number]) on 3/18/2018 7:55:10 AM



RHYTHM STRIP - SCAN (2018 12:30 PM)MR brain without IV contrast (2018 12:33 PM)





 Specimen  Performing Laboratory

 

    RIS









 Narrative

 

 FINAL REPORT







  







 PATIENT ID: [id number]







  







 MRI Brain without contrast







  







 Clinical History: Stroke







  







 Technique: MRI of the brain utilizing axial T2, FLAIR, GRE, DWI; 







 sagittal and coronal T1-weighted images.







  







 Comparisons: None







  







 Findings:







  







 There is no evidence of acute infarct or hemorrhage. There is no 







 significant appearing white matter disease. There is mild generalized 







 sulcal prominence without hydrocephalus, midline shift, or apparent 







 mass effect. There are no extra-axial fluid collections. The 







 craniocervical junction is preserved. The major intracranial 







 flow-voids appear patent. 







  







 IMPRESSION:







  







 No evidence of acute infarct, hemorrhage, or hydrocephalus.







  







 Signed: Doreen Schmidt MD







 Report Verified Date/Time:03/10/2018 17:37:18







  







 Reading Location: [address] Neuro Reading Room







 Electronically signed by: DOREEN SCHMIDT M.D. on 03/10/2018 05:37 
PM







 









 Procedure Note

 

 Interface, External Ris In - 2018 12:33 PM CDT



FINAL REPORT



 



 PATIENT ID:   [id number]



 



 MRI Brain without contrast



 



 Clinical History: Stroke



 



 Technique: MRI of the brain utilizing axial T2, FLAIR, GRE, DWI;



 sagittal and coronal T1-weighted images.



 



 Comparisons: None



 



 Findings:



 



 There is no evidence of acute infarct or hemorrhage. There is no



 significant appearing white matter disease. There is mild generalized



 sulcal prominence without hydrocephalus, midline shift, or apparent



 mass effect. There are no extra-axial fluid collections. The



 craniocervical junction is preserved. The major intracranial



 flow-voids appear patent.



 



 IMPRESSION:



 



 No evidence of acute infarct, hemorrhage, or hydrocephalus.



 



 Signed: Doreen Schmidt MD



 Report Verified Date/Time:  03/10/2018 17:37:18



 



 Reading Location: [address] Neuro Reading Room



       Electronically signed by: DOREEN SCHMIDT M.D. on 03/10/2018 05:37 PM



 



MR MRA neck without contrast (2018 12:33 PM)





 Specimen  Performing Laboratory

 

    RIS









 Narrative

 

 FINAL REPORT







  







 PATIENT ID: [id number]







  







 MRA Head







  







 CLINICAL HISTORY: Stroke







  







 TECHNIQUE: MRA of the head utilizing 3-D time-of-flight technique, 







 with 3-D reconstructions.







  







 COMPARISON: None







  







 FINDINGS:







  







 There is no evidence of intracranial aneurysm, focal stenosis, or 







 major branch vessel occlusion. 







 There is a fetal origin right posterior cerebral artery.







  







 IMPRESSION:







  







 No evidence for a major [place] of [place] proximal branch vessel 







 occlusion.







  







  







  







 MRA Neck







  







 CLINICAL HISTORY: Stroke







  







 TECHNIQUE: MRA of the neck utilizing 2-D and 3-D time-of-flight 







 technique, with 3-D reconstructions.







  







 COMPARISON: None







  







 FINDINGS:







  







 The carotid arteries in the neck are patent including their 







 bifurcations. 







  







 There is antegrade flow in the vertebral arteries in the neck.







  







 IMPRESSION:







  







 No evidence of hemodynamically significant stenosis in the cervical 







 carotid or vertebral arteries by NASCET criteria.







  







 Signed: Doreen Schmidt MD







 Report Verified Date/Time:03/10/2018 17:43:53







  







 Reading Location: [address] Neuro Reading Room







 Electronically signed by: DOREEN SCHMIDT M.D. on 03/10/2018 05:43 
PM







 









 Procedure Note

 

 Interface, External Ris In - 2018 12:33 PM CDT



FINAL REPORT



 



 PATIENT ID:   [id number]



 



 MRA Head



 



 CLINICAL HISTORY: Stroke



 



 TECHNIQUE: MRA of the head utilizing 3-D time-of-flight technique,



 with 3-D reconstructions.



 



 COMPARISON: None



 



 FINDINGS:



 



 There is no evidence of intracranial aneurysm, focal stenosis, or



 major branch vessel occlusion.



 There is a fetal origin right posterior cerebral artery.



 



 IMPRESSION:



 



 No evidence for a major [place] of [place] proximal branch vessel



 occlusion.



 



 



 



 MRA Neck



 



 CLINICAL HISTORY: Stroke



 



 TECHNIQUE: MRA of the neck utilizing 2-D and 3-D time-of-flight



 technique, with 3-D reconstructions.



 



 COMPARISON: None



 



 FINDINGS:



 



 The carotid arteries in the neck are patent including their



 bifurcations.



 



 There is antegrade flow in the vertebral arteries in the neck.



 



 IMPRESSION:



 



 No evidence of hemodynamically significant stenosis in the cervical



 carotid or vertebral arteries by NASCET criteria.



 



 Signed: Doreen Schmidt MD



 Report Verified Date/Time:  03/10/2018 17:43:53



 



 Reading Location: [address] Neuro Reading Room



       Electronically signed by: DOREEN SCHMIDT M.D. on 03/10/2018 05:43 PM



 



MR MRA head without contrast (2018 12:33 PM)





 Specimen  Performing Laboratory

 

    RIS









 Narrative

 

 FINAL REPORT







  







 PATIENT ID: [id number]







  







 MRA Head







  







 CLINICAL HISTORY: Stroke







  







 TECHNIQUE: MRA of the head utilizing 3-D time-of-flight technique, 







 with 3-D reconstructions.







  







 COMPARISON: None







  







 FINDINGS:







  







 There is no evidence of intracranial aneurysm, focal stenosis, or 







 major branch vessel occlusion. 







 There is a fetal origin right posterior cerebral artery.







  







 IMPRESSION:







  







 No evidence for a major [place] of [place] proximal branch vessel 







 occlusion.







  







  







  







 MRA Neck







  







 CLINICAL HISTORY: Stroke







  







 TECHNIQUE: MRA of the neck utilizing 2-D and 3-D time-of-flight 







 technique, with 3-D reconstructions.







  







 COMPARISON: None







  







 FINDINGS:







  







 The carotid arteries in the neck are patent including their 







 bifurcations. 







  







 There is antegrade flow in the vertebral arteries in the neck.







  







 IMPRESSION:







  







 No evidence of hemodynamically significant stenosis in the cervical 







 carotid or vertebral arteries by NASCET criteria.







  







 Signed: Doreen Schmidt MD







 Report Verified Date/Time:03/10/2018 17:43:53







  







 Reading Location: [address] Neuro Reading Room







 Electronically signed by: DOREEN SCHMIDT M.D. on 03/10/2018 05:43 
PM







 









 Procedure Note

 

 Interface, External Ris In - 2018 10:50 AM CDT



FINAL REPORT



 



 PATIENT ID:   [id number]



 



 MRA Head



 



 CLINICAL HISTORY: Stroke



 



 TECHNIQUE: MRA of the head utilizing 3-D time-of-flight technique,



 with 3-D reconstructions.



 



 COMPARISON: None



 



 FINDINGS:



 



 There is no evidence of intracranial aneurysm, focal stenosis, or



 major branch vessel occlusion.



 There is a fetal origin right posterior cerebral artery.



 



 IMPRESSION:



 



 No evidence for a major [place] of [place] proximal branch vessel



 occlusion.



 



 



 



 MRA Neck



 



 CLINICAL HISTORY: Stroke



 



 TECHNIQUE: MRA of the neck utilizing 2-D and 3-D time-of-flight



 technique, with 3-D reconstructions.



 



 COMPARISON: None



 



 FINDINGS:



 



 The carotid arteries in the neck are patent including their



 bifurcations.



 



 There is antegrade flow in the vertebral arteries in the neck.



 



 IMPRESSION:



 



 No evidence of hemodynamically significant stenosis in the cervical



 carotid or vertebral arteries by NASCET criteria.



 



 Signed: Doreen Schmidt MD



 Report Verified Date/Time:  03/10/2018 17:43:53



 



 Reading Location: [address] Neuro Reading Room



       Electronically signed by: DOREEN SCHMIDT M.D. on 03/10/2018 05:43 PM



 



TRANSFUSION SERVICE REPORT - SCAN (03/10/2018  5:53 PM)Vitamin B12 and Folate (
03/10/2018  4:13 AM)





 Component  Value  Ref Range

 

 Vitamin B12  >2000 (H)  213 - 816 pg/mL

 

 Folate  15.0  >=7.0 ng/mL









 Specimen  Performing Laboratory

 

 Blood  [address] [address]



Fasting lipid panel (03/10/2018  4:13 AM)





 Component  Value  Ref Range

 

 Triglycerides  58  mg/dL

 

 Cholesterol  170  mg/dL

 

 HDL  51  mg/dL

 

 LDL Calculated  107  mg/dL









 Specimen  Performing Laboratory

 

 Blood  [address] [address]









 Narrative

 

  







 Triglyceride Reference Range:







  Low Risk <150







  [id number]







  High Risk 200-499







  Very High Risk>=500







  







 Cholesterol Reference Range:







  Low Risk <200







  Whbfcrzimz105-047 







  High Risk>240







  







 HDL Cholesterol Reference Range:







  Low Risk >=60







  High Risk <40







  







 LDL Cholesterol Reference Range:







  Optimal<100







  Near Efkobez276-984







  Fucnwxtecs883-482







  Mehr641-939







  Very High >=190 







  







 Fasting



TSH/Free T4 If Indicated (2018  6:30 PM)





 Component  Value  Ref Range

 

 TSH  1.15  0.35 - 4.94 uIU/mL









 Specimen  Performing Laboratory

 

 Blood - Arm, Left  [address] [address]



Type and screen, automated (2018  5:37 PM)





 Component  Value  Ref Range

 

 ABO/RH AUTOMATED (BEAKER)  O POSITIVE  

 

 Ab Scrn  NEGATIVE  









 Specimen  Performing Laboratory

 

 Blood  [address] [address]



Prothrombin time/INR (2018  5:37 PM)





 Component  Value  Ref Range

 

 Protime  13.5  11.7 - 14.7 seconds

 

 INR  1.0  <=5.9









 Specimen  Performing Laboratory

 

 Blood  [address] [address]









 Narrative

 

  







 RECOMMENDED COUMADIN/WARFARIN INR THERAPY RANGES







 STANDARD DOSE: 2.0 - 3.0 Includes: PROPHYLAXIS for venous thrombosis, 
systemic



 embolization; TREATMENT for venous thrombosis and/or pulmonary embolus.







 HIGH RISK: Target INR is 2.5-3.5 for patients with mechanical heart valves.



Hemoglobin A1c (2018  5:37 PM)





 Component  Value  Ref Range

 

 Hemoglobin A1C  5.3  4.3 - 6.1 %









 Specimen  Performing Laboratory

 

 Blood  [facility]







   [address] [place] [address]



Urinalysis w/Microscopic (2018  5:18 PM)





 Component  Value  Ref Range

 

 Color, UA  Light Yellow  

 

 Clarity, UA  Clear  

 

 Specific Gravity, UA  1.006  1.001 - 1.035

 

 pH, UA  7.0  5.0 - 8.0

 

 Protein, UA  Negative  Negative

 

 Glucose, UA  Negative  Negative

 

 Ketones, UA  40 mg/dL (A)  Negative

 

 Bilirubin, UA  Negative  Negative

 

 Blood, UA  Negative  Negative

 

 Nitrite, UA  Negative  Negative

 

 Leukocytes, UA  Negative  Negative

 

 Urobilinogen, UA  0.2  0.2 - 1.0 mg/dL

 

 RBC, UA  0  /HPF

 

 WBC, UA  0  /HPF

 

 Mucus  Rare  

 

 Squam Epithel, UA  6  /HPF

 

 Specimen Source  Urine, Voided  









 Specimen  Performing Laboratory

 

 Urine - Urine, Voided  [facility] [address] [address]



after 2017

## 2018-06-15 NOTE — OP
Date of Procedure:  06/14/2018



Surgeon:  Sonya Mayorga MD



Assistant:  Maddie Agarwal.



Preoperative Diagnoses:  Heavy irregular menstrual cycles, dysmenorrhea, dyspareunia and pelvic pain.




Procedures Performed:  Total laparoscopic hysterectomy, bilateral salpingo-oophorectomy, and lysis of
 sigmoid adhesions and omental adhesions.



Anesthesia:  General.



Estimated Blood Loss:  Minimal.



Complications:  No complications.



Drains:  No drains.



Condition:  The patient's condition was stable.



Indications:  The patient is a 48-year-old, who was my patient, who had increased menstrual periods, 
severe cramps and dyspareunia.  She was worked up with an ultrasound and endometrial biopsy.  These w
ere all later on negative for endometrial hyperplasia, atypia or malignancy.  We offered different op
tions to this patient including the endometrial ablation with laparoscopy and a possible endo treatme
nt, salpingectomy, medical treatment using an IUD and depot medroxyprogesterone and oral contraceptiv
es.  After she considered all the options, benefits and risks, she wanted to proceed with an option t
hat would completely take care of her bleeding forever, which was a hysterectomy.  Discussed the bene
fits and risks of the hysterectomy and invasiveness of the procedure; however, given the significant 
amount of pain and the pain she is experiencing, this is not an unreasonable option.  After she under
stood the risks of the procedure, she was given time to reconsider, come back and questions were answ
ered.  She originally consented for a laparoscopic hysterectomy, bilateral salpingectomy, possible oo
phorectomy, and she decided that she definitely want to have an oophorectomy, did not want to have to
 come back and deal with this.  So, we talked about hormone therapy postop and we consented her for T
LH/BSO in the preop area after she was sure.



Description Of Procedure:  After 1 g of Ancef was given, she was taken back to the OR, placed in supi
ne fashion on the operating table.  After general anesthesia was given, she was placed in a dorsal li
thotomy position using Min stirrups.  Pelvic exam was performed.  Uterus was anteflexed, about 8 we
eks size, mobile, no adnexal masses.  No scar tissue palpated. 



Abdomen, vulva, vagina, and perineum were prepped and draped in a sterile fashion.  Mosley was placed 
to drain the bladder and attached to cysto tubing, to an LR bag, emptied 300.  Then a large VCare was
 introduced into the uterus and fixed in place and this area was draped. 



A 1-cm infraumbilical incision was made at the old scar site and fascia was exposed after dissecting 
through the scar and excising the old scar.  Then, fascia was incised with a 15 blade and the edges t
agged with a 0 Vicryl suture.  Peritoneum was entered bluntly.  S retractor was placed and camera int
roduced into the peritoneal cavity and survey performed.  Liver, gallbladder and appendix all appeare
d to be unremarkable.  Peritoneal surface was unremarkable, omentum stuck on top of the uterus and th
e left tube. 



The patient was placed in T-power, 5 mm left lower quadrant suprapubic ports were placed under direct 
vision, then looked inside and surveyed the entire pelvic cavity.  There were dense adhesions of the 
omentum.  There were Filshie clips that were placed on the left tube.  Partially, the omentum had to 
be taken down leaving a specimen of the omentum.  This was taken down with the help of LigaSure and t
hen there was good hemostasis of the omentum.  There were adhesions of the sigmoid colon to the left 
adnexa at the origin of the IP and the lateral wall, so these were taken down sharply with scissors w
ith push spread dissection and occasionally with using the bipolar cautery with the fine tip LigaSure
.  Once all the adhesions were taken down systematically until about 15 minutes of the case, I then p
roceeded with the surgery.  Both ovaries were visualized, both tubes were visualized and unremarkable
.  Both tubes had Filshie clips.  Both ureters were visualized on each side and there was excellent e
xposure and no evidence of any distortion of anatomy. 



A 5 mm LigaSure was used to take down the proximal part of the left tube.  Then, the utero-ovarian li
gament, round ligament, anterior broad ligament were opened up to raise the bladder flap all the way 
to the opposite side and posteriorly peritoneum taken down to the left uterosacral and the broad liga
ment taken down with the LigaSure to skeletonize the vessels. 



On the opposite side, a similar dissection was done to take down the mesosalpinx, the proximal part o
f the tube, distal stump was left, but the tube was dissected laterally to slop over to the side of t
he ovary.  Then, the round ligament taken down, the anterior broad ligament connected to finish the b
ladder flap and posteriorly take it down to the uterosacral and then skeletonized the broad ligament 
to expose the vessels.  Then, peritoneum with the precervical fascia was dissected.  Vesicovaginal sp
ace was entered with the help of monopolar hook blade, taking down this tissue.  There was areolar ti
ssue.  Then the bladder was dissected inferiorly without any problems.  Then, we went down to take do
wn the vessels after making a medial opening with a monopolar on the cuff medial to the vessels on th
e right.  Then, the LigaSure were used to take down the vessels individually and then the cardinal li
gaments were also taken down. 



On the opposite side, a similar dissection was done to take down the vessels, cardinal ligament.  The
n, the cuff was exposed and circumferential colpotomy was performed with a monopolar hook blade witho
ut any problems and the specimen detached and removed through the vagina. 



After thorough irrigation and suction were performed, the tube and ovary were removed on the left narayan
e and then on the right side with the help of the LigaSure, dissection was relatively easy and unrema
rkable.  The specimens were all retrieved through the vagina including the omental specimen with tube
 attached and a Filshie clip from the left side. 



After thorough irrigation and suction were performed, there was excellent hemostasis on the vaginal c
uff.  This was closed with the help of 0 V-Loc PDS in a continuous running fashion starting at the le
ft angle.  The long stitch was cut down and the knot was placed here and the suturing was started fro
m below the right end of the vaginal cuff continuously in a running fashion taken down all the way to
 the lateral aspect of the vaginal cuff on the left and then medially another stitch was taken to bur
y the tail and went on to close the peritoneum with the help of a 3-0 Vicryl in a continuous running 
fashion.  This was tied on top to retro-peritonealize the V-Loc. 



Thorough irrigation and suction were performed.  All pedicles were completely hemostatic.  Ureters oleary
d no evidence of electrical, mechanical, or thermal injury to them and all the trocars were removed u
nder direct vision.  Fascia closed with the help of 0 Vicryl in a figure-of-eight fashion and subcuta
neous tissue was brought together with the simple 0 Vicryl stitch, simple 0 Vicryl stitch on the fasc
ia at the suprapubic area as well and 4-0 Monocryl for all the skin closure.  A Mosley, VCare bulb wer
e all removed.  Instrument, needle, and sponge count and device count were all correct.  At the end o
f the case, the patient tolerated the procedure well.  She was extubated in the OR and taken to PACU 
in stable condition.  EBL was minimal.





SK/MODL

DD:  06/14/2018 15:06:21Voice ID:  [id number]

DT:  06/15/2018 01:56:17Report ID:  [id number]